# Patient Record
Sex: MALE | Race: ASIAN | Employment: FULL TIME | URBAN - METROPOLITAN AREA
[De-identification: names, ages, dates, MRNs, and addresses within clinical notes are randomized per-mention and may not be internally consistent; named-entity substitution may affect disease eponyms.]

---

## 2017-05-18 ENCOUNTER — MED REC SCAN ONLY (OUTPATIENT)
Dept: FAMILY MEDICINE CLINIC | Facility: CLINIC | Age: 47
End: 2017-05-18

## 2020-07-08 ENCOUNTER — MED REC SCAN ONLY (OUTPATIENT)
Dept: FAMILY MEDICINE CLINIC | Facility: CLINIC | Age: 50
End: 2020-07-08

## 2020-09-29 ENCOUNTER — TELEPHONE (OUTPATIENT)
Dept: FAMILY MEDICINE CLINIC | Facility: CLINIC | Age: 50
End: 2020-09-29

## 2020-09-29 NOTE — TELEPHONE ENCOUNTER
Patient stated he has had pain in left side of face for past 3-4 days. He is scheduled with Dr. Leonela Hardin for tomorrow 9/30. Please advise if he needs triage.

## 2020-09-30 ENCOUNTER — OFFICE VISIT (OUTPATIENT)
Dept: FAMILY MEDICINE CLINIC | Facility: CLINIC | Age: 50
End: 2020-09-30
Payer: COMMERCIAL

## 2020-09-30 VITALS
TEMPERATURE: 98 F | DIASTOLIC BLOOD PRESSURE: 90 MMHG | HEART RATE: 61 BPM | OXYGEN SATURATION: 98 % | HEIGHT: 68 IN | RESPIRATION RATE: 18 BRPM | WEIGHT: 140 LBS | SYSTOLIC BLOOD PRESSURE: 124 MMHG | BODY MASS INDEX: 21.22 KG/M2

## 2020-09-30 DIAGNOSIS — M26.649 TMJ ARTHRITIS: Primary | ICD-10-CM

## 2020-09-30 DIAGNOSIS — R03.0 ELEVATED BLOOD-PRESSURE READING WITHOUT DIAGNOSIS OF HYPERTENSION: ICD-10-CM

## 2020-09-30 DIAGNOSIS — Z00.00 BLOOD TESTS FOR ROUTINE GENERAL PHYSICAL EXAMINATION: ICD-10-CM

## 2020-09-30 PROCEDURE — 3008F BODY MASS INDEX DOCD: CPT | Performed by: FAMILY MEDICINE

## 2020-09-30 PROCEDURE — 3074F SYST BP LT 130 MM HG: CPT | Performed by: FAMILY MEDICINE

## 2020-09-30 PROCEDURE — 99202 OFFICE O/P NEW SF 15 MIN: CPT | Performed by: FAMILY MEDICINE

## 2020-09-30 PROCEDURE — 3080F DIAST BP >= 90 MM HG: CPT | Performed by: FAMILY MEDICINE

## 2020-09-30 RX ORDER — PREDNISONE 20 MG/1
20 TABLET ORAL DAILY
Qty: 5 TABLET | Refills: 0 | Status: SHIPPED | OUTPATIENT
Start: 2020-09-30 | End: 2020-10-05

## 2020-09-30 NOTE — PROGRESS NOTES
/90   Pulse 61   Temp 98.2 °F (36.8 °C) (Temporal)   Resp 18   Ht 68\"   Wt 140 lb (63.5 kg)   SpO2 98%   BMI 21.29 kg/m²               Patient presents with:  Pain: left face pain       HPI;    Presybeterianelvis Chand is a 45year old male.   Who is been see exertion  GI: denies abdominal pain and denies heartburn  NEURO: denies headaches  EXAM:   /90   Pulse 61   Temp 98.2 °F (36.8 °C) (Temporal)   Resp 18   Ht 68\"   Wt 140 lb (63.5 kg)   SpO2 98%   BMI 21.29 kg/m²   GENERAL: well developed, well leo CBC W Differential W Platelet      Comp Metabolic Panel      Lipid Panel      TSH W Reflex To Free T4      PSA            Shadia Rosa MD   [address] [place] [address]    Electronically signed    This dictation was pe

## 2020-10-21 ENCOUNTER — MED REC SCAN ONLY (OUTPATIENT)
Dept: FAMILY MEDICINE CLINIC | Facility: CLINIC | Age: 50
End: 2020-10-21

## 2021-02-21 ENCOUNTER — APPOINTMENT (OUTPATIENT)
Dept: GENERAL RADIOLOGY | Age: 51
End: 2021-02-21
Attending: PHYSICIAN ASSISTANT
Payer: COMMERCIAL

## 2021-02-21 ENCOUNTER — HOSPITAL ENCOUNTER (OUTPATIENT)
Age: 51
Discharge: HOME | End: 2021-02-21
Payer: COMMERCIAL

## 2021-02-21 VITALS
RESPIRATION RATE: 18 BRPM | TEMPERATURE: 98 F | BODY MASS INDEX: 21 KG/M2 | HEART RATE: 72 BPM | WEIGHT: 140 LBS | SYSTOLIC BLOOD PRESSURE: 146 MMHG | OXYGEN SATURATION: 96 % | DIASTOLIC BLOOD PRESSURE: 84 MMHG

## 2021-02-21 DIAGNOSIS — M25.559 HIP PAIN: Primary | ICD-10-CM

## 2021-02-21 DIAGNOSIS — M54.41 RIGHT-SIDED LOW BACK PAIN WITH RIGHT-SIDED SCIATICA, UNSPECIFIED CHRONICITY: ICD-10-CM

## 2021-02-21 PROCEDURE — 99203 OFFICE O/P NEW LOW 30 MIN: CPT | Performed by: PHYSICIAN ASSISTANT

## 2021-02-21 PROCEDURE — 73502 X-RAY EXAM HIP UNI 2-3 VIEWS: CPT | Performed by: PHYSICIAN ASSISTANT

## 2021-02-21 RX ORDER — CYCLOBENZAPRINE HCL 5 MG
5 TABLET ORAL 3 TIMES DAILY PRN
Qty: 15 TABLET | Refills: 0 | Status: SHIPPED | OUTPATIENT
Start: 2021-02-21 | End: 2021-02-26

## 2021-02-21 RX ORDER — METHYLPREDNISOLONE 4 MG/1
TABLET ORAL
Qty: 1 PACKAGE | Refills: 0 | Status: SHIPPED | OUTPATIENT
Start: 2021-02-21

## 2021-02-21 NOTE — ED INITIAL ASSESSMENT (HPI)
RLQ pain x 2 months. Pain now radiating around to r flank & down front of leg. Denies fevers, denies n/v/d. Normal BM PTA. Denies UTI s/s. Worse w ambulation. Denies constipation.

## 2021-02-21 NOTE — ED PROVIDER NOTES
Patient Seen in: Immediate [address] [place], [place] [address]      History   Patient presents with:  Abdomen/Flank Pain: RLQ x 2 months    Stated Complaint: abdominal pain    HPI/Subjective:   HPI    CHIEF COMPLAINT: Right-sided back, hip pain    HISTORY OF PRESENT I and agree. The patient's family history is reviewed and is noncontributory to the presenting problem, except as indicated as above.     Objective:   Past Medical History:   Diagnosis Date   • Chest pain, unspecified    • Impaired fasting glucose    • Lumbag hernias. No rashes. Femoral pulse 2+. Lumbar spine: No midline or bony tenderness, but there is right-sided paraspinal muscle spasm and tenderness that extends in the right gluteal region and into the right lateral hip.   Pain is reproducible with moveme symptoms. Patient was screened and evaluated during this visit. I determined, within reasonable clinical confidence and prior to discharge, that an emergency medical condition was not or was no longer present.   There was no indication for further eval Medication List    START taking these medications    methylPREDNISolone (MEDROL) 4 MG Oral Tablet Therapy Pack  Dosepack: take as directed  Qty: 1 Package Refills: 0    cyclobenzaprine 5 MG Oral Tab  Take 1 tablet (5 mg total) by mouth 3 (three) times yair

## 2021-02-25 DIAGNOSIS — R73.01 IMPAIRED FASTING GLUCOSE: Primary | ICD-10-CM

## 2021-02-25 NOTE — PROGRESS NOTES
Your  blood work is normal, except for elevated sugar at 120 and bad cholesterol mildly elevated at 105    Hemoglobin A1c has been added to your labs and we will report that separately

## 2021-02-26 LAB
ABSOLUTE BASOPHILS: 64 CELLS/UL (ref 0–200)
ABSOLUTE EOSINOPHILS: 42 CELLS/UL (ref 15–500)
ABSOLUTE LYMPHOCYTES: 2608 CELLS/UL (ref 850–3900)
ABSOLUTE MONOCYTES: 806 CELLS/UL (ref 200–950)
ABSOLUTE NEUTROPHILS: 7081 CELLS/UL (ref 1500–7800)
ALBUMIN/GLOBULIN RATIO: 1.7 (CALC) (ref 1–2.5)
ALBUMIN: 4.4 G/DL (ref 3.6–5.1)
ALKALINE PHOSPHATASE: 66 U/L (ref 35–144)
ALT: 21 U/L (ref 9–46)
AST: 16 U/L (ref 10–35)
BASOPHILS: 0.6 %
BILIRUBIN, TOTAL: 1 MG/DL (ref 0.2–1.2)
BUN: 13 MG/DL (ref 7–25)
CALCIUM: 9.6 MG/DL (ref 8.6–10.3)
CARBON DIOXIDE: 27 MMOL/L (ref 20–32)
CHLORIDE: 104 MMOL/L (ref 98–110)
CHOL/HDLC RATIO: 3.7 (CALC)
CHOLESTEROL, TOTAL: 171 MG/DL
CREATININE: 0.97 MG/DL (ref 0.7–1.33)
EGFR IF AFRICN AM: 104 ML/MIN/1.73M2
EGFR IF NONAFRICN AM: 90 ML/MIN/1.73M2
EOSINOPHILS: 0.4 %
GLOBULIN: 2.6 G/DL (CALC) (ref 1.9–3.7)
GLUCOSE: 120 MG/DL (ref 65–99)
HDL CHOLESTEROL: 46 MG/DL
HEMATOCRIT: 43.7 % (ref 38.5–50)
HEMOGLOBIN A1C: 6.4 % OF TOTAL HGB
HEMOGLOBIN: 14.3 G/DL (ref 13.2–17.1)
LDL-CHOLESTEROL: 105 MG/DL (CALC)
LYMPHOCYTES: 24.6 %
MCH: 26.5 PG (ref 27–33)
MCHC: 32.7 G/DL (ref 32–36)
MCV: 80.9 FL (ref 80–100)
MONOCYTES: 7.6 %
MPV: 10.8 FL (ref 7.5–12.5)
NEUTROPHILS: 66.8 %
NON-HDL CHOLESTEROL: 125 MG/DL (CALC)
PLATELET COUNT: 246 THOUSAND/UL (ref 140–400)
POTASSIUM: 3.8 MMOL/L (ref 3.5–5.3)
PROTEIN, TOTAL: 7 G/DL (ref 6.1–8.1)
PSA, TOTAL: 1 NG/ML
RDW: 13 % (ref 11–15)
RED BLOOD CELL COUNT: 5.4 MILLION/UL (ref 4.2–5.8)
SODIUM: 141 MMOL/L (ref 135–146)
TRIGLYCERIDES: 104 MG/DL
TSH W/REFLEX TO FT4: 1.63 MIU/L (ref 0.4–4.5)
WHITE BLOOD CELL COUNT: 10.6 THOUSAND/UL (ref 3.8–10.8)

## 2021-02-26 NOTE — PROGRESS NOTES
Your hemoglobin A1c is 6.4 which is in the prediabetic range  Continue diet and exercise  Monitor your sugar intake  We will recheck again in 6 months

## 2021-03-01 ENCOUNTER — OFFICE VISIT (OUTPATIENT)
Dept: FAMILY MEDICINE CLINIC | Facility: CLINIC | Age: 51
End: 2021-03-01
Payer: COMMERCIAL

## 2021-03-01 VITALS
SYSTOLIC BLOOD PRESSURE: 126 MMHG | WEIGHT: 146 LBS | RESPIRATION RATE: 16 BRPM | OXYGEN SATURATION: 99 % | HEART RATE: 55 BPM | DIASTOLIC BLOOD PRESSURE: 84 MMHG | BODY MASS INDEX: 22.13 KG/M2 | TEMPERATURE: 97 F | HEIGHT: 68 IN

## 2021-03-01 DIAGNOSIS — G89.29 CHRONIC BILATERAL LOW BACK PAIN, UNSPECIFIED WHETHER SCIATICA PRESENT: ICD-10-CM

## 2021-03-01 DIAGNOSIS — Z12.11 SCREENING FOR MALIGNANT NEOPLASM OF COLON: ICD-10-CM

## 2021-03-01 DIAGNOSIS — M54.50 CHRONIC BILATERAL LOW BACK PAIN, UNSPECIFIED WHETHER SCIATICA PRESENT: ICD-10-CM

## 2021-03-01 DIAGNOSIS — Z00.00 ENCOUNTER FOR ANNUAL PHYSICAL EXAM: Primary | ICD-10-CM

## 2021-03-01 DIAGNOSIS — R73.03 PREDIABETES: ICD-10-CM

## 2021-03-01 DIAGNOSIS — Z23 NEED FOR VACCINATION: ICD-10-CM

## 2021-03-01 PROCEDURE — 99213 OFFICE O/P EST LOW 20 MIN: CPT | Performed by: FAMILY MEDICINE

## 2021-03-01 PROCEDURE — 3074F SYST BP LT 130 MM HG: CPT | Performed by: FAMILY MEDICINE

## 2021-03-01 PROCEDURE — 90471 IMMUNIZATION ADMIN: CPT | Performed by: FAMILY MEDICINE

## 2021-03-01 PROCEDURE — 90750 HZV VACC RECOMBINANT IM: CPT | Performed by: FAMILY MEDICINE

## 2021-03-01 PROCEDURE — 3008F BODY MASS INDEX DOCD: CPT | Performed by: FAMILY MEDICINE

## 2021-03-01 PROCEDURE — 99396 PREV VISIT EST AGE 40-64: CPT | Performed by: FAMILY MEDICINE

## 2021-03-01 PROCEDURE — 3079F DIAST BP 80-89 MM HG: CPT | Performed by: FAMILY MEDICINE

## 2021-03-02 NOTE — PROGRESS NOTES
/84   Pulse 55   Temp 96.7 °F (35.9 °C) (Temporal)   Resp 16   Ht 5' 8\" (1.727 m)   Wt 146 lb (66.2 kg)   SpO2 99%   BMI 22.20 kg/m²  Body mass index is 22.2 kg/m².      Patient presents with:  Physical      Jan Guerita Crump is a 45year old male wh Sexual Activity      Alcohol use: No      Drug use: No    Other Topics      Concerns:        Caffeine Concern: Yes        Exercise: Yes        Seat Belt: Yes     Exercise: none.   Diet: doesn't watch     REVIEW OF SYSTEMS:   GENERAL HEALTH: feels well other annual physical exam  Fasting labs discussed, impaired fasting glucose, prediabetes as well as slightly elevated LDL  Patient is advised to diet and exercise  We will recheck his hemoglobin A1c in 6 months  Immunizations reviewed   Shingles vaccine #1 admi office note. If so, please bring any errors to my attention for an addendum.  All efforts were made to ensure that this office note is accurate

## 2021-04-05 ENCOUNTER — MED REC SCAN ONLY (OUTPATIENT)
Dept: FAMILY MEDICINE CLINIC | Facility: CLINIC | Age: 51
End: 2021-04-05

## 2021-10-28 ENCOUNTER — TELEPHONE (OUTPATIENT)
Dept: FAMILY MEDICINE CLINIC | Facility: CLINIC | Age: 51
End: 2021-10-28

## 2023-04-14 ENCOUNTER — TELEPHONE (OUTPATIENT)
Dept: FAMILY MEDICINE CLINIC | Facility: CLINIC | Age: 53
End: 2023-04-14

## 2023-04-17 ENCOUNTER — PATIENT OUTREACH (OUTPATIENT)
Dept: CASE MANAGEMENT | Age: 53
End: 2023-04-17

## 2023-04-17 NOTE — PROCEDURES
The office order for PCP removal request is Denied and finalized on April 17, 2023. Denied - Office to Verify PCP Status:   1. Office must call patient to verify PCP status and request to schedule an appointment with provider. 2. If no response, Office should also send a letter to patient via mail and My-Chart requesting to schedule office visit with provider.     Thanks,  [facility] [employer]

## 2023-06-05 ENCOUNTER — OFFICE VISIT (OUTPATIENT)
Dept: FAMILY MEDICINE CLINIC | Facility: CLINIC | Age: 53
End: 2023-06-05
Payer: COMMERCIAL

## 2023-06-05 VITALS
HEART RATE: 78 BPM | SYSTOLIC BLOOD PRESSURE: 124 MMHG | WEIGHT: 148 LBS | BODY MASS INDEX: 22.43 KG/M2 | RESPIRATION RATE: 18 BRPM | HEIGHT: 68 IN | TEMPERATURE: 98 F | DIASTOLIC BLOOD PRESSURE: 78 MMHG | OXYGEN SATURATION: 97 %

## 2023-06-05 DIAGNOSIS — Z23 NEED FOR VACCINATION: ICD-10-CM

## 2023-06-05 DIAGNOSIS — Z12.11 SCREENING FOR COLON CANCER: ICD-10-CM

## 2023-06-05 DIAGNOSIS — G89.29 CHRONIC BILATERAL BACK PAIN, UNSPECIFIED BACK LOCATION: ICD-10-CM

## 2023-06-05 DIAGNOSIS — Z00.00 ENCOUNTER FOR ANNUAL PHYSICAL EXAM: Primary | ICD-10-CM

## 2023-06-05 DIAGNOSIS — M54.9 CHRONIC BILATERAL BACK PAIN, UNSPECIFIED BACK LOCATION: ICD-10-CM

## 2023-06-05 PROCEDURE — 3074F SYST BP LT 130 MM HG: CPT | Performed by: FAMILY MEDICINE

## 2023-06-05 PROCEDURE — 99396 PREV VISIT EST AGE 40-64: CPT | Performed by: FAMILY MEDICINE

## 2023-06-05 PROCEDURE — 90750 HZV VACC RECOMBINANT IM: CPT | Performed by: FAMILY MEDICINE

## 2023-06-05 PROCEDURE — 3078F DIAST BP <80 MM HG: CPT | Performed by: FAMILY MEDICINE

## 2023-06-05 PROCEDURE — 90471 IMMUNIZATION ADMIN: CPT | Performed by: FAMILY MEDICINE

## 2023-06-05 PROCEDURE — 3008F BODY MASS INDEX DOCD: CPT | Performed by: FAMILY MEDICINE

## 2023-07-26 ENCOUNTER — TELEPHONE (OUTPATIENT)
Dept: FAMILY MEDICINE CLINIC | Facility: CLINIC | Age: 53
End: 2023-07-26

## 2024-01-29 ENCOUNTER — TELEPHONE (OUTPATIENT)
Dept: FAMILY MEDICINE CLINIC | Facility: CLINIC | Age: 54
End: 2024-01-29

## 2024-08-29 ENCOUNTER — OFFICE VISIT (OUTPATIENT)
Facility: LOCATION | Age: 54
End: 2024-08-29
Payer: COMMERCIAL

## 2024-08-29 VITALS
OXYGEN SATURATION: 99 % | HEIGHT: 68 IN | RESPIRATION RATE: 18 BRPM | BODY MASS INDEX: 22.43 KG/M2 | HEART RATE: 62 BPM | WEIGHT: 148 LBS | TEMPERATURE: 97 F

## 2024-08-29 DIAGNOSIS — Z12.11 SCREEN FOR COLON CANCER: Primary | ICD-10-CM

## 2024-08-29 DIAGNOSIS — Z12.11 ENCOUNTER FOR SCREENING COLONOSCOPY: ICD-10-CM

## 2024-08-29 RX ORDER — POLYETHYLENE GLYCOL 3350, SODIUM CHLORIDE, SODIUM BICARBONATE, POTASSIUM CHLORIDE 420; 11.2; 5.72; 1.48 G/4L; G/4L; G/4L; G/4L
POWDER, FOR SOLUTION ORAL
Qty: 1 EACH | Refills: 0 | Status: SHIPPED | OUTPATIENT
Start: 2024-08-29

## 2024-09-05 NOTE — PROGRESS NOTES
New Patient Visit Note       Active Problems      1. Screen for colon cancer    2. Encounter for screening colonoscopy        Chief Complaint   Chief Complaint   Patient presents with    Colonoscopy     EP-colonoscopy no family history/no pain         PCP  Fitz Mooney MD     History of Present Illness   Jan Piper is a 54 year old male who presents for evaluation for colonoscopy.    The patient has not had previous colonoscopy.     The patient's family history is negative .    The patient denies nausea, vomiting, abdominal pain or cramping or bloating, diarrhea, constipation, bright red blood in the stool, dark tarry stools, other recent changes in bowel habits, or recent weight loss.    The patient's past medical history   Past Medical History:    Chest pain, unspecified    Impaired fasting glucose    Lumbago    Tietze's disease         The patient's past surgical history History reviewed. No pertinent surgical history.      The patient takes no blood thinners..      Past Medical / Surgical / Social / Family History    The past medical history, past surgical history, family history, social history, allergies, and medications have been reviewed by me today.    No current outpatient medications on file prior to visit.     No current facility-administered medications on file prior to visit.        Review of Systems  Constitutional: No Fever, No Chills, No Diaphoresis, No fatigue, No weight loss, No anorexia  Eyes: No burry vision, No icterus  ENT: No tinnitus, No rhinorrhea, No dysphagia, No odynophagia  Respiratory: No dyspnea, No wheezing  Cardiovascular: No chest pain, No palpitations, No leg swelling  Gastrointestinal: No abdominal pain, No abdominal distention, No bloating, No nausea, No vomiting, No diarrhea, No constipation, No melena, No hematochezia  Endocrine: No polydipsia, No polyuria  Genitoruinary: No dysuria, No Hematuria  Musculoskeletal: No joint pain, No muscle pain  Neurologic: No dizzyness,  No syncope, No headaches, No numbness  Hematologic: No easy bruising, No easy bleeding  Psychiatric: No anxiety, No depression      Physical Findings   Pulse 62   Temp 96.9 °F (36.1 °C)   Resp 18   Ht 68\"   Wt 148 lb (67.1 kg)   SpO2 99%   BMI 22.50 kg/m²     Constitutional: Well nourished, well kempt  Eyes: EOMI, no scleral icterus  ENT: Nares moist, oropharynx clear  Neck: Supple, no tracheal deviation   Cardiac: Normal rate, no JVD  Respiratory: No respiratory distress, No audible wheezing  Abdominal: Soft, Nontender, Nondistended  Muskuloskeletal: Normal gait, Full ROM in all extremities  Lymphatic: no cervical or supraclavicular adenopathy  Skin: No rashes,  No lesions  Neurologic: No focal deficits  Psych: Appropriate mood and affect, oriented x3            Assessment   1. Screen for colon cancer    2. Encounter for screening colonoscopy          Plan   The patient is 54 year old and has not had a colonoscopy. The patient's family history is negative. The patient does not have gastrointestinal symptoms.     Recommend proceeding with colonoscopy.    The benefits of colonoscopy, including detection of cancer and polyp removal prior to progression to cancer were discussed. The details of the procedure which include navigation of the colonoscope through the anus, rectum, and colon to evaluate the mucosa and removal of any polyps encountered were discussed as well. The risks of colonoscopy were discussed with the patient and include but are not limited to post-procedure bleeding, infection, colorectal perforation, splenic injury, missed polyps, need for additional surgeries or interventions. All questions were answered and the patient voiced understanding and agreed to proceed with colonoscopy.            Davy Boland MD

## 2024-10-15 ENCOUNTER — TELEPHONE (OUTPATIENT)
Facility: LOCATION | Age: 54
End: 2024-10-15

## 2024-10-15 NOTE — TELEPHONE ENCOUNTER
No authorization required for cpt code 61375. Ref #: [id number]. Spoke with Kalee SOARES @ 12:18pm on 10/15

## 2024-10-22 ENCOUNTER — TELEPHONE (OUTPATIENT)
Facility: LOCATION | Age: 54
End: 2024-10-22

## 2024-10-22 NOTE — TELEPHONE ENCOUNTER
Sent patient Colonoscopy prep instruction in [employer]. Also called and spoke with patient. Confirmed prep instructions. Patient stated he did not have prep medication. Informed patient that we would re send prescription. Spoke with [facility] pharmacy [facility]. They will fill previously sent prescription.

## 2024-10-22 NOTE — TELEPHONE ENCOUNTER
Patient calling because he doesn't have his colonoscopy prep sheet, and wants to know what to do.    Please advise  Best callback number is [phone]

## 2024-10-23 ENCOUNTER — ANESTHESIA EVENT (OUTPATIENT)
Dept: ENDOSCOPY | Facility: HOSPITAL | Age: 54
End: 2024-10-23
Payer: COMMERCIAL

## 2024-10-23 NOTE — ANESTHESIA PREPROCEDURE EVALUATION
PRE-OP EVALUATION    Patient Name: Jan Piper    Admit Diagnosis: Screen for colon cancer [Z12.11]    Pre-op Diagnosis: Screen for colon cancer [Z12.11]    COLONOSCOPY    Anesthesia Procedure: COLONOSCOPY    Surgeons and Role:     * Davy Boland MD - Primary    Pre-op vitals reviewed.        Body mass index is 22.5 kg/m².    Current medications reviewed.  Hospital Medications:  No current facility-administered medications on file as of .       Outpatient Medications:   Prescriptions Prior to Admission[1]    Allergies: Patient has no known allergies.      Anesthesia Evaluation    Patient summary reviewed.    Anesthetic Complications  (-) history of anesthetic complications         GI/Hepatic/Renal    Negative GI/hepatic/renal ROS.                             Cardiovascular    Negative cardiovascular ROS.    Exercise tolerance: good     MET: >4                                           Endo/Other                           (+) arthritis       Pulmonary    Negative pulmonary ROS.                       Neuro/Psych    Negative neuro/psych ROS.                          Patient Active Problem List:     TMJ arthritis     Prediabetes        History reviewed. No pertinent surgical history.  Social History     Socioeconomic History   • Marital status:    Tobacco Use   • Smoking status: Never   • Smokeless tobacco: Never   • Tobacco comments:     Non-smoker   Vaping Use   • Vaping status: Never Used   Substance and Sexual Activity   • Alcohol use: Not Currently     Alcohol/week: 2.0 standard drinks of alcohol     Types: 2 Shots of liquor per week     Comment: Occasionally   • Drug use: No   Other Topics Concern   • Caffeine Concern No   • Stress Concern No   • Weight Concern No   • Special Diet No   • Exercise Yes     Comment: 3 miles walking daily   • Seat Belt No     History   Drug Use No     Available pre-op labs reviewed.               Airway      Mallampati: II  Mouth opening: 3 FB  TM distance: 4 - 6  cm  Neck ROM: full Cardiovascular    Cardiovascular exam normal.         Dental    Dentition appears grossly intact         Pulmonary    Pulmonary exam normal.                 Other findings        ASA: 2   Plan: MAC  NPO status verified and patient meets guidelines.    Post-procedure pain management plan discussed with surgeon and patient.      Plan/risks discussed with: patient            Present on Admission:  **None**             [1]   No medications prior to admission.

## 2024-10-24 ENCOUNTER — ANESTHESIA (OUTPATIENT)
Dept: ENDOSCOPY | Facility: HOSPITAL | Age: 54
End: 2024-10-24
Payer: COMMERCIAL

## 2024-10-24 ENCOUNTER — HOSPITAL ENCOUNTER (OUTPATIENT)
Facility: HOSPITAL | Age: 54
Setting detail: HOSPITAL OUTPATIENT SURGERY
Discharge: HOME | End: 2024-10-24
Attending: STUDENT IN AN ORGANIZED HEALTH CARE EDUCATION/TRAINING PROGRAM | Admitting: STUDENT IN AN ORGANIZED HEALTH CARE EDUCATION/TRAINING PROGRAM
Payer: COMMERCIAL

## 2024-10-24 VITALS
SYSTOLIC BLOOD PRESSURE: 111 MMHG | OXYGEN SATURATION: 94 % | HEART RATE: 74 BPM | TEMPERATURE: 98 F | DIASTOLIC BLOOD PRESSURE: 79 MMHG | HEIGHT: 68 IN | WEIGHT: 148 LBS | BODY MASS INDEX: 22.43 KG/M2 | RESPIRATION RATE: 16 BRPM

## 2024-10-24 DIAGNOSIS — Z12.11 SCREEN FOR COLON CANCER: ICD-10-CM

## 2024-10-24 PROCEDURE — 0DBN8ZX EXCISION OF SIGMOID COLON, VIA NATURAL OR ARTIFICIAL OPENING ENDOSCOPIC, DIAGNOSTIC: ICD-10-PCS | Performed by: STUDENT IN AN ORGANIZED HEALTH CARE EDUCATION/TRAINING PROGRAM

## 2024-10-24 PROCEDURE — 88305 TISSUE EXAM BY PATHOLOGIST: CPT | Performed by: STUDENT IN AN ORGANIZED HEALTH CARE EDUCATION/TRAINING PROGRAM

## 2024-10-24 PROCEDURE — 0DBP8ZX EXCISION OF RECTUM, VIA NATURAL OR ARTIFICIAL OPENING ENDOSCOPIC, DIAGNOSTIC: ICD-10-PCS | Performed by: STUDENT IN AN ORGANIZED HEALTH CARE EDUCATION/TRAINING PROGRAM

## 2024-10-24 PROCEDURE — 0DBH8ZX EXCISION OF CECUM, VIA NATURAL OR ARTIFICIAL OPENING ENDOSCOPIC, DIAGNOSTIC: ICD-10-PCS | Performed by: STUDENT IN AN ORGANIZED HEALTH CARE EDUCATION/TRAINING PROGRAM

## 2024-10-24 RX ORDER — SODIUM CHLORIDE, SODIUM LACTATE, POTASSIUM CHLORIDE, CALCIUM CHLORIDE 600; 310; 30; 20 MG/100ML; MG/100ML; MG/100ML; MG/100ML
INJECTION, SOLUTION INTRAVENOUS CONTINUOUS
Status: DISCONTINUED | OUTPATIENT
Start: 2024-10-24 | End: 2024-10-24

## 2024-10-24 RX ORDER — LIDOCAINE HYDROCHLORIDE 10 MG/ML
INJECTION, SOLUTION EPIDURAL; INFILTRATION; INTRACAUDAL; PERINEURAL AS NEEDED
Status: DISCONTINUED | OUTPATIENT
Start: 2024-10-24 | End: 2024-10-24 | Stop reason: SURG

## 2024-10-24 RX ADMIN — LIDOCAINE HYDROCHLORIDE 50 MG: 10 INJECTION, SOLUTION EPIDURAL; INFILTRATION; INTRACAUDAL; PERINEURAL at 08:46:00

## 2024-10-24 RX ADMIN — SODIUM CHLORIDE, SODIUM LACTATE, POTASSIUM CHLORIDE, CALCIUM CHLORIDE: 600; 310; 30; 20 INJECTION, SOLUTION INTRAVENOUS at 09:13:00

## 2024-10-24 NOTE — DISCHARGE INSTRUCTIONS
Home Care Instructions for Colonoscopy with Sedation    Diet:  - Resume your regular diet as tolerated unless otherwise instructed.  - Start with light meals to minimize bloating.  - Do not drink alcohol today.    Medication:  - If you have questions about resuming your normal medications, please contact your Primary Care Physician.    Activities:  - Take it easy today. Do not return to work today.  - Do not drive today.  - Do not operate any machinery today (including kitchen equipment).    Colonoscopy:  - You may notice some rectal \"spotting\" (a little blood on the toilet tissue) for a day or two after the exam. This is normal.  - If you experience any rectal bleeding (not spotting), persistent tenderness or sharp severe abdominal pains, oral temperature over 100 degrees Fahrenheit, light-headedness or dizziness, or any other problems, contact your doctor.    **If unable to reach your doctor, please go to the [facility] Emergency Room**    - Your referring physician will receive a full report of your examination.  - If you do not hear from your doctor's office within two weeks of your biopsy, please call them for your results.    You may be able to see your laboratory results in [employer] between 4 and 7 business days.  In some cases, your physician may not have viewed the results before they are released to [employer].  If you have questions regarding your results contact the physician who ordered the test/exam by phone or via [employer] by choosing \"Ask a Medical Question.\"

## 2024-10-24 NOTE — H&P
New Patient Visit Note     Active Problems      1. Screen for colon cancer    2. Encounter for screening colonoscopy          Chief Complaint        Chief Complaint   Patient presents with    Colonoscopy       EP-colonoscopy no family history/no pain            PCP  Fitz Mooney MD      History of Present Illness   Jan Piper is a 54 year old male who presents for evaluation for colonoscopy.     The patient has not had previous colonoscopy.      The patient's family history is negative .     The patient denies nausea, vomiting, abdominal pain or cramping or bloating, diarrhea, constipation, bright red blood in the stool, dark tarry stools, other recent changes in bowel habits, or recent weight loss.     The patient's past medical history   Past Medical History       Past Medical History:    Chest pain, unspecified    Impaired fasting glucose    Lumbago    Tietze's disease              The patient's past surgical history   Past Surgical History   History reviewed. No pertinent surgical history.           The patient takes no blood thinners..        Past Medical / Surgical / Social / Family History    The past medical history, past surgical history, family history, social history, allergies, and medications have been reviewed by me today.     Medications Ordered Prior to this Encounter   No current outpatient medications on file prior to visit.      No current facility-administered medications on file prior to visit.            Review of Systems  Constitutional: No Fever, No Chills, No Diaphoresis, No fatigue, No weight loss, No anorexia  Eyes: No burry vision, No icterus  ENT: No tinnitus, No rhinorrhea, No dysphagia, No odynophagia  Respiratory: No dyspnea, No wheezing  Cardiovascular: No chest pain, No palpitations, No leg swelling  Gastrointestinal: No abdominal pain, No abdominal distention, No bloating, No nausea, No vomiting, No diarrhea, No constipation, No melena, No hematochezia  Endocrine: No  polydipsia, No polyuria  Genitoruinary: No dysuria, No Hematuria  Musculoskeletal: No joint pain, No muscle pain  Neurologic: No dizzyness, No syncope, No headaches, No numbness  Hematologic: No easy bruising, No easy bleeding  Psychiatric: No anxiety, No depression        Physical Findings      Constitutional: Well nourished, well kempt  Eyes: EOMI, no scleral icterus  ENT: Nares moist, oropharynx clear  Neck: Supple, no tracheal deviation   Cardiac: Normal rate, no JVD  Respiratory: No respiratory distress, No audible wheezing  Abdominal: Soft, Nontender, Nondistended  Muskuloskeletal: Normal gait, Full ROM in all extremities  Lymphatic: no cervical or supraclavicular adenopathy  Skin: No rashes,  No lesions  Neurologic: No focal deficits  Psych: Appropriate mood and affect, oriented x3           Assessment   1. Screen for colon cancer    2. Encounter for screening colonoscopy             Plan   The patient is 54 year old and has not had a colonoscopy. The patient's family history is negative. The patient does not have gastrointestinal symptoms.      Recommend proceeding with colonoscopy.     The benefits of colonoscopy, including detection of cancer and polyp removal prior to progression to cancer were discussed. The details of the procedure which include navigation of the colonoscope through the anus, rectum, and colon to evaluate the mucosa and removal of any polyps encountered were discussed as well. The risks of colonoscopy were discussed with the patient and include but are not limited to post-procedure bleeding, infection, colorectal perforation, splenic injury, missed polyps, need for additional surgeries or interventions. All questions were answered and the patient voiced understanding and agreed to proceed with colonoscopy.                 Davy Boland MD

## 2024-10-24 NOTE — OPERATIVE REPORT
[facility]  Operative Note    Jan Piper Location: OR   [facility] [id number] MRN [id number]    1970 Age 54 year old   Admission Date 10/24/2024 Operation Date 10/24/2024   Attending Physician Davy Boland MD Operating Physician Davy Boland MD   PCP Fitz Mooney MD          Patient Name: Jan Piper    Preoperative Diagnosis: Screen for colon cancer [Z12.11]    Postoperative Diagnosis: Same as pre-op diagnosis.    Primary Surgeon: Davy Boland MD    Anesthesia: MAC    Anesthesiologist: Anesthesiologist.: Myerson, David, MD    Procedures: Colonoscopy to the cecum , polypectomy x 4     Specimen:   Appendiceal orifice polyp  Rectosigmoid junction polyp  Rectal polyp x2    Estimated Blood Loss:3mL     Complications: None immediate    Condition: Good    Indications for Surgery:   Jan Piper is a 54 year old male who is here for screening colonoscopy. The benefits of colonoscopy, including detection of cancer and polyp removal prior to progression to cancer were discussed. The details of the procedure which include navigation of the colonoscope through the anus, rectum, and colon to evaluate the mucosa and removal of any polyps encountered were discussed as well. The risks of colonoscopy were discussed with the patient and include but are not limited to post-procedure bleeding, infection, colorectal perforation, splenic injury, missed polyps, need for additional surgeries or interventions. All questions were answered and the patient voiced understanding and agreed to proceed with colonoscopy.      Surgical Findings:   The quality of the bowel prep was excellent. 3,3,3    Description of Procedure:   The Patient was taken to the endoscopy suite and positioned in the left lateral decubitus position with knees flexed. Monitored anesthesia care was administered. A time-out was performed.    The perineum and perianal skin were examined. A digital rectal examination was  performed. No masses or abnormalities noted. A well-lubricated adult colonoscope was then inserted and carefully navigated to the cecum. Advancement was accomplished without difficulty. The appendiceal orifice and ileocecal valve were identified and photographed. The terminal ileum was not intubated. The scope was then withdrawn as the mucosa was circumferentially examined. A 2 mm sessile polyp was noted at the appendiceal orifice and was removed with foreceps. The distal sigmoid colon showed a 3mm pedunclated polyp that was removed with cold snare. The rectum showed a 1 mm hyperplastic polyp that was removed with foreceps and a 2mm sessile polyp that was removed with a cold snare.  In the rectum the scope was retroflexed to evaluate the anorectal junction.  The scope was straightened and excess gas was suctioned from the colon and the scope removed, terminating the procedure.    Anesthesia was terminated and the patient transported to the recovery unit in good condition. The patient tolerated the procedure well without apparent intraoperative complication.    Follow up:   Patient will need next colonoscopy pending pathology results.       Davy Boland MD  10/24/2024  9:11 AM

## 2024-10-24 NOTE — ANESTHESIA POSTPROCEDURE EVALUATION
[facility]    Jan Piper Patient Status:  Hospital Outpatient Surgery   Age/Gender 54 year old male MRN [id number]   Location [facility] Attending Davy Boland MD   Hosp Day # 0 PCP Fitz Mooney MD       Anesthesia Post-op Note    COLONOSCOPY with forcep and cold snare polypectomy    Procedure Summary       Date: 10/24/24 Room / Location:  ENDOSCOPY 02 / [facility]    Anesthesia Start: 0842 Anesthesia Stop: 0913    Procedure: COLONOSCOPY with forcep and cold snare polypectomy Diagnosis:       Screen for colon cancer      (Polyps, diverticulosis)    Surgeons: Davy Boland MD Anesthesiologist: Myerson, David, MD    Anesthesia Type: MAC ASA Status: 2            Anesthesia Type: MAC    Vitals Value Taken Time   /63 10/24/24 0913   Temp 97.6 °F (36.4 °C) 10/24/24 0913   Pulse 82 10/24/24 0913   Resp 16 10/24/24 0913   SpO2 97 % 10/24/24 0913       Patient Location: Endoscopy    Anesthesia Type: MAC    Airway Patency: patent    Postop Pain Control: adequate    Mental Status: preanesthetic baseline    Nausea/Vomiting: none    Cardiopulmonary/Hydration status: stable euvolemic    Complications: no apparent anesthesia related complications    Postop vital signs: stable    Dental Exam: Unchanged from Preop    Patient to be discharged home when criteria met.

## 2025-03-18 ENCOUNTER — OFFICE VISIT (OUTPATIENT)
Dept: FAMILY MEDICINE CLINIC | Facility: CLINIC | Age: 55
End: 2025-03-18
Payer: COMMERCIAL

## 2025-03-18 VITALS
OXYGEN SATURATION: 99 % | HEART RATE: 62 BPM | DIASTOLIC BLOOD PRESSURE: 80 MMHG | HEIGHT: 66 IN | TEMPERATURE: 97 F | BODY MASS INDEX: 24.01 KG/M2 | RESPIRATION RATE: 16 BRPM | SYSTOLIC BLOOD PRESSURE: 120 MMHG | WEIGHT: 149.38 LBS

## 2025-03-18 DIAGNOSIS — R73.01 IFG (IMPAIRED FASTING GLUCOSE): ICD-10-CM

## 2025-03-18 DIAGNOSIS — Z00.00 ENCOUNTER FOR ANNUAL PHYSICAL EXAM: Primary | ICD-10-CM

## 2025-03-18 PROCEDURE — 99396 PREV VISIT EST AGE 40-64: CPT | Performed by: FAMILY MEDICINE

## 2025-03-18 NOTE — PROGRESS NOTES
/80   Pulse 62   Temp 96.9 °F (36.1 °C) (Temporal)   Resp 16   Ht 5' 6\" (1.676 m)   Wt 149 lb 6 oz (67.8 kg)   SpO2 99%   BMI 24.11 kg/m²  Body mass index is 24.11 kg/m².     Chief Complaint   Patient presents with    Physical     Dr. Mooney would like to use a new tool that securely records the visit.  This will help him write his notes, so he can pay closer attention to you and less time on the computer  The following individual(s) verbally consented to be recorded using ambient AI listening technology and understand that they can each withdraw their consent to this listening technology at any point by asking the clinician to turn off or pause the recording:    Patient name: Jan Piper   Additional names:          Test Results     Colonoscopy       Jan Piper is a 54 year old male who presents for a complete physical exam.   HPI:     History of Present Illness  Jan Piper is a 54 year old male who presents for an annual physical exam.    He was last seen in June 2023 for a physical and lab work, which he did not complete at that time. No new symptoms or issues have been reported since his last visit.    He underwent a colonoscopy in October 2024, which revealed a polyp at the appendix, a polyp at the rectosigmoid, and two rectal polyps. Pathology showed no cancer, with findings of tubular adenomas and hyperplastic polyps. He is unsure of the follow-up interval recommended by the performing physician.    He mentions having undergone a heart scan, which showed a calcium score of zero, indicating no calcified plaque in the coronary arteries. His last A1c was 6.4, but he is uncertain of the exact date of this test.    He is due for a pneumonia vaccine, which he has not yet received. He has some concern about potential side effects but is informed that it is not a live vaccine.    He traveled to [place] for three months after his colonoscopy and returned a month ago. His  phone was operational during his trip, suggesting he was reachable despite being abroad.    No chest pain or other acute symptoms.     Wt Readings from Last 4 Encounters:   03/18/25 149 lb 6 oz (67.8 kg)   10/24/24 148 lb (67.1 kg)   08/29/24 148 lb (67.1 kg)   06/05/23 148 lb (67.1 kg)     Body mass index is 24.11 kg/m².   BP Readings from Last 3 Encounters:   03/18/25 120/80   10/24/24 111/79   06/05/23 124/78      Current Outpatient Medications   Medication Sig Dispense Refill    PEG 3350-KCl-Na Bicarb-NaCl (TRILYTE) 420 g Oral Recon Soln Starting at 4:00 pm the night before procedure, drink 8 ounces of the prep every 15-20 minutes until finished 1 each 0      Past Medical History:    Chest pain, unspecified    Impaired fasting glucose    Lumbago    Tietze's disease      Past Surgical History:   Procedure Laterality Date    Colonoscopy N/A 10/24/2024    Procedure: COLONOSCOPY with forcep and cold snare polypectomy;  Surgeon: Davy Boland MD;  Location:  ENDOSCOPY      Family History   Problem Relation Age of Onset    Other (Reflux [Other]) Unknown       Social History:  Social History     Socioeconomic History    Marital status:    Tobacco Use    Smoking status: Never    Smokeless tobacco: Never    Tobacco comments:     Non-smoker   Vaping Use    Vaping status: Never Used   Substance and Sexual Activity    Alcohol use: Yes     Alcohol/week: 2.0 standard drinks of alcohol     Types: 2 Shots of liquor per week     Comment: Occasionally    Drug use: No   Other Topics Concern    Caffeine Concern No    Stress Concern No    Weight Concern No    Special Diet No    Exercise Yes     Comment: 3 miles walking daily    Seat Belt No      Exercise: none.  Diet: doesn't watch     REVIEW OF SYSTEMS:   GENERAL HEALTH: feels well otherwise, denies fever  SKIN: denies any unusual skin lesions or rashes  EYES: no visual complaints or deficits  HEENT: denies nasal congestion, sinus pain or sore throat; hearing loss  negative  RESPIRATORY: denies shortness of breath, wheezing or cough  CARDIOVASCULAR: denies chest pain or BARROSO; no palpitations  GI: denies nausea, vomiting, constipation, diarrhea; no rectal bleeding; no heartburn  MUSCULOSKELETAL: no joint complaints upper or lower extremities  NEURO: no sensory or motor complaint  PSYCHE: no symptoms of depression or anxiety  HEMATOLOGY: denies h/o anemia; denies bruising or excessive bleeding  ENDOCRINE: denies excessive thirst or urination; denies unexpected wt gain or wt loss  ALLERGY/IMM.: denies food or seasonal allergies    EXAM:   /80   Pulse 62   Temp 96.9 °F (36.1 °C) (Temporal)   Resp 16   Ht 5' 6\" (1.676 m)   Wt 149 lb 6 oz (67.8 kg)   SpO2 99%   BMI 24.11 kg/m²  Body mass index is 24.11 kg/m².   GENERAL: well developed, well nourished,in no apparent distress  SKIN: no rashes,no suspicious lesions  HEENT: atraumatic, normocephalic,ears and throat are clear  EYES:PERRLA, EOMI,conjunctiva are clear  NECK: supple,no adenopathy,no bruits  CHEST: no chest tenderness  LUNGS: clear to auscultation  CARDIO: RRR without murmur  GI: good BS's,no masses, HSM or tenderness  : Deferred  RECTAL:Deferred  MUSCULOSKELETAL: back is not tender,FROM of the back  EXTREMITIES: no cyanosis, clubbing or edema  NEURO: Oriented times three,cranial nerves are intact,motor and sensory are grossly intact    ASSESSMENT AND PLAN:   :Jan was seen today for physical and test results.    Diagnoses and all orders for this visit:    Encounter for annual physical exam  Jan Piper is a 54 year old male who presents for a complete physical exam.   Pt's weight is Body mass index is 24.11 kg/m².,   Eat a heart-healthy diet. Include potassium and fiber, and drink plenty of water.   Exercise regularly --- Dietary measures discussed include diet about 1800 calories - Excercise regimen also reviewed as well as long term benefits on overall health.   Recommend at least 30 minutes a  day 3-4 times a week of aerobic activity such as brisk walking, cycling, aerobics, or swimming.  Anerobic activities also encouraged for overall toning and strength/endurance building    The patient indicates understanding of these issues and agrees to the plan.-Fasting labs ordered  Immunizations reviewed  Patient would like to get Prevnar 20 today  Depression screen completed   diet and exercise counseling given     CBC With Differential With Platelet  -     Comp Metabolic Panel (14)  -     Lipid Panel  -     TSH and Free T4  -     PSA - Total [5363][Q]    IFG (impaired fasting glucose)  Impaired fasting glucose  A1c at 6.4 indicates impaired fasting glucose. Repeat blood work needed.  --     Hemoglobin A1C    Other orders  -     Prevnar 20 (PCV20) [87118]        .        No follow-ups on file.        Fitz Mooney MD, 3/18/2025, 3:06 PM      This dictation was performed with a verbal recognition program (DRAGON) and it was checked for errors. It is possible that there are still dictated errors within this office note. If so, please bring any errors to my attention for an addendum. All efforts were made to ensure that this office note is accurate

## 2025-03-18 NOTE — PATIENT INSTRUCTIONS
VISIT SUMMARY:    Jan Piper, a 54-year-old male, came in for his annual physical exam. He has no new symptoms since his last visit in June 2023. He had a colonoscopy in October 2024, which found polyps but no cancer. He also had a heart scan showing no calcified plaque and an A1c test indicating impaired fasting glucose. He is due for a pneumonia vaccine and has agreed to receive it.    YOUR PLAN:    -COLONIC POLYPS: Colonic polyps are growths on the lining of the colon which can sometimes develop into cancer. Your colonoscopy found polyps, including tubular adenomas, which are not cancerous but may require more frequent check-ups. Please contact your gastroenterologist to discuss the follow-up interval for your next colonoscopy.    -IMPAIRED FASTING GLUCOSE: Impaired fasting glucose means your blood sugar levels are higher than normal but not high enough to be classified as diabetes. Your A1c level was 6.4, and we need to repeat blood work to monitor this. We will order tests for complete blood count, chemistry, cholesterol, thyroid, prostate, and A1c.    -GENERAL HEALTH MAINTENANCE: You are due for a pneumonia vaccine, which helps protect against pneumonia. We discussed the potential side effects, and you agreed to receive the Prevnar 20 vaccine today.    INSTRUCTIONS:    Please follow up with your gastroenterologist regarding the surveillance interval for your colonoscopy. Additionally, we will need to repeat your blood work to monitor your glucose levels and other health markers.

## 2025-03-20 LAB
ABSOLUTE BASOPHILS: 47 CELLS/UL (ref 0–200)
ABSOLUTE EOSINOPHILS: 87 CELLS/UL (ref 15–500)
ABSOLUTE LYMPHOCYTES: 2848 CELLS/UL (ref 850–3900)
ABSOLUTE MONOCYTES: 563 CELLS/UL (ref 200–950)
ABSOLUTE NEUTROPHILS: 3156 CELLS/UL (ref 1500–7800)
ALBUMIN/GLOBULIN RATIO: 1.6 (CALC) (ref 1–2.5)
ALBUMIN: 4.4 G/DL (ref 3.6–5.1)
ALKALINE PHOSPHATASE: 74 U/L (ref 35–144)
ALT: 37 U/L (ref 9–46)
AST: 23 U/L (ref 10–35)
BASOPHILS: 0.7 %
BILIRUBIN, TOTAL: 0.9 MG/DL (ref 0.2–1.2)
BUN: 9 MG/DL (ref 7–25)
CALCIUM: 9.7 MG/DL (ref 8.6–10.3)
CARBON DIOXIDE: 25 MMOL/L (ref 20–32)
CHLORIDE: 106 MMOL/L (ref 98–110)
CHOL/HDLC RATIO: 4.2 (CALC)
CHOLESTEROL, TOTAL: 166 MG/DL
CREATININE: 1.06 MG/DL (ref 0.7–1.3)
EGFR: 83 ML/MIN/1.73M2
EOSINOPHILS: 1.3 %
GLOBULIN: 2.8 G/DL (CALC) (ref 1.9–3.7)
GLUCOSE: 122 MG/DL (ref 65–99)
HDL CHOLESTEROL: 40 MG/DL
HEMATOCRIT: 44.1 % (ref 38.5–50)
HEMOGLOBIN A1C: 7.1 % OF TOTAL HGB
HEMOGLOBIN: 14.1 G/DL (ref 13.2–17.1)
LDL-CHOLESTEROL: 99 MG/DL (CALC)
LYMPHOCYTES: 42.5 %
MCH: 26.2 PG (ref 27–33)
MCHC: 32 G/DL (ref 32–36)
MCV: 82 FL (ref 80–100)
MONOCYTES: 8.4 %
MPV: 10.5 FL (ref 7.5–12.5)
NEUTROPHILS: 47.1 %
NON-HDL CHOLESTEROL: 126 MG/DL (CALC)
PLATELET COUNT: 225 THOUSAND/UL (ref 140–400)
POTASSIUM: 4.5 MMOL/L (ref 3.5–5.3)
PROTEIN, TOTAL: 7.2 G/DL (ref 6.1–8.1)
PSA, TOTAL: 0.91 NG/ML
RDW: 13.3 % (ref 11–15)
RED BLOOD CELL COUNT: 5.38 MILLION/UL (ref 4.2–5.8)
SODIUM: 139 MMOL/L (ref 135–146)
T4, FREE: 1.3 NG/DL (ref 0.8–1.8)
TRIGLYCERIDES: 172 MG/DL
TSH: 4.98 MIU/L (ref 0.4–4.5)
WHITE BLOOD CELL COUNT: 6.7 THOUSAND/UL (ref 3.8–10.8)

## 2025-03-21 NOTE — PROGRESS NOTES
Elevated sugar at 122 and abnormal A1c at 7.1, that has crossed the threshold of prediabetes  Please follow-up, to start medication for diabetes  Your blood work also reveals abnormal TSH that needs to be repeated

## 2025-03-24 ENCOUNTER — OFFICE VISIT (OUTPATIENT)
Dept: FAMILY MEDICINE CLINIC | Facility: CLINIC | Age: 55
End: 2025-03-24
Payer: COMMERCIAL

## 2025-03-24 VITALS
SYSTOLIC BLOOD PRESSURE: 120 MMHG | HEART RATE: 69 BPM | BODY MASS INDEX: 23.84 KG/M2 | RESPIRATION RATE: 16 BRPM | WEIGHT: 148.38 LBS | DIASTOLIC BLOOD PRESSURE: 80 MMHG | TEMPERATURE: 97 F | OXYGEN SATURATION: 98 % | HEIGHT: 66 IN

## 2025-03-24 DIAGNOSIS — R79.89 ABNORMAL TSH: ICD-10-CM

## 2025-03-24 DIAGNOSIS — E11.9 TYPE 2 DIABETES MELLITUS WITHOUT COMPLICATION, WITHOUT LONG-TERM CURRENT USE OF INSULIN (HCC): Primary | ICD-10-CM

## 2025-03-24 PROCEDURE — 99214 OFFICE O/P EST MOD 30 MIN: CPT | Performed by: FAMILY MEDICINE

## 2025-03-24 RX ORDER — ROSUVASTATIN CALCIUM 10 MG/1
10 TABLET, COATED ORAL NIGHTLY
Qty: 90 TABLET | Refills: 3 | Status: SHIPPED | OUTPATIENT
Start: 2025-03-24

## 2025-03-24 RX ORDER — METFORMIN HYDROCHLORIDE 500 MG/1
500 TABLET, EXTENDED RELEASE ORAL 2 TIMES DAILY WITH MEALS
Qty: 180 TABLET | Refills: 2 | Status: SHIPPED | OUTPATIENT
Start: 2025-03-24

## 2025-03-24 RX ORDER — LISINOPRIL 5 MG/1
5 TABLET ORAL DAILY
Qty: 90 TABLET | Refills: 1 | Status: SHIPPED | OUTPATIENT
Start: 2025-03-24

## 2025-03-24 NOTE — PROGRESS NOTES
Subjective:   Jan Piper is a 54 year old male who presents for Lab Results (Dr. Mooney would like to use a new tool that securely records the visit.  This will help him write his notes, so he can pay closer attention to you and less time on the computer/The following individual(s) verbally consented to be recorded using ambient AI listening technology and understand that they can each withdraw their consent to this listening technology at any point by asking the clinician to turn off or pause the recording://Patient name: Jan Piper /Additional names:  /  )       History/Other:   History of Present Illness  Jan Piper is a 54 year old male with diabetes who presents for management of high blood sugar levels.    He has been experiencing elevated blood sugar levels, which he attributes to increased stress. Despite being more health-conscious, he feels that his current environment contributes to these elevated levels. He describes his daily routine as a continuous cycle of stress impacting his health.    No chest pain, shortness of breath, or palpitations.   Chief Complaint Reviewed and Verified  Nursing Notes Reviewed and   Verified  Tobacco Reviewed  Allergies Reviewed  Medications Reviewed    Problem List Reviewed  Medical History Reviewed  Surgical History   Reviewed  Family History Reviewed       Tobacco:  He has never smoked tobacco.    Current Outpatient Medications   Medication Sig Dispense Refill    PEG 3350-KCl-Na Bicarb-NaCl (TRILYTE) 420 g Oral Recon Soln Starting at 4:00 pm the night before procedure, drink 8 ounces of the prep every 15-20 minutes until finished 1 each 0       PHQ-2 SCORE: 0  , done 3/18/2025          Review of Systems:  Review of Systems:   Constitutional: No fevers, chills, fatigue or night sweats.  ENT: No mouth pain, neck pain, running nose, headaches or swollen glands.  Skin: No rashes, pruritus or skin changes,  Respiratory: Denies cough,  wheezing or shortness of breath.  CV: Denies chest pain, palpitations, orthopnea, PND or dizziness.  Musculoskeletal: No joint pain, stiffness or swelling.  GI: No nausea, vomiting or diarrhea. No blood in stools.  Neurologic: No seizures, tremors, weakness or numbness      Objective:   /80   Pulse 69   Temp 96.8 °F (36 °C) (Temporal)   Resp 16   Ht 5' 6\" (1.676 m)   Wt 148 lb 6 oz (67.3 kg)   SpO2 98%   BMI 23.95 kg/m²  Estimated body mass index is 23.95 kg/m² as calculated from the following:    Height as of this encounter: 5' 6\" (1.676 m).    Weight as of this encounter: 148 lb 6 oz (67.3 kg).  Results  LABS  TSH: abnormal   Hba1c 7.1  Physical Exam        Assessment & Plan:   There are no diagnoses linked to this encounter.  Assessment & Plan  Type 2 Diabetes Mellitus  New diagnosis with elevated blood glucose. Discussed complications: retinopathy, cardiovascular events, amputations, nephropathy. Emphasized glycemic control. Explained metformin's role. Discussed continuous glucose monitor and insurance issues.  - Prescribe metformin 500 mg twice daily.  - Refer to ophthalmology for diabetic retinopathy screening.  - Provide glucometer for home blood glucose monitoring.  - Discuss potential use of continuous glucose monitor, noting insurance coverage issues.  Statin therapy recommended to reduce cardiovascular risk associated with diabetes.  - Prescribe rosuvastatin once daily.    Chronic Kidney Disease Risk  Prophylactic ACE inhibitor for nephroprotection in diabetes context.  - Prescribe lisinopril once daily.    Thyroid Function Abnormality  Mildly elevated TSH. Monitor thyroid function over three months.  - Repeat TSH test in three months.    General Health Maintenance  Emphasized lifestyle modifications, including dietary changes, for diabetes management.  - Advise dietary modifications to support diabetes management.    Follow-up  Plan to reassess treatment regimen effectiveness and monitor for  adverse effects.  - Schedule follow-up in three months to review blood tests and assess treatment efficacy.        No follow-ups on file.        Fitz Mooney MD, 3/24/2025, 4:47 PM

## 2025-06-21 LAB
AMB EXT BILIRUBIN URINE: NEGATIVE
AMB EXT BLOOD URINE: NEGATIVE
AMB EXT BUN: 8 MG/DL (ref ?–8)
AMB EXT CHOL/HDL RATIO: 4.6
AMB EXT CHOLESTEROL, TOTAL: 151 MG/DL
AMB EXT GLUCOSE URINE: NEGATIVE
AMB EXT GLUCOSE: 93 MG/DL (ref 70–100)
AMB EXT HDL CHOLESTEROL: 33 MG/DL
AMB EXT HGBA1C: 6.2 %
AMB EXT KETONES URINE: NEGATIVE
AMB EXT LDL CHOLESTEROL, DIRECT: 98 MG/DL
AMB EXT LEUKOCYTE ESTERASE URINE: POSITIVE
AMB EXT MCV: 79.6 (ref ?–83)
AMB EXT NITRITE URINE: NEGATIVE
AMB EXT NON HDL CHOL: 118 MG/DL
AMB EXT PH URINE: 6
AMB EXT PLATELETS: 205
AMB EXT PROTEIN URINE: NEGATIVE
AMB EXT TRIGLYCERIDES: 103 MG/DL
AMB EXT TSH: 4.51 MIU/ML
AMB EXT URINE SPECIFIC GRAVITY: 1.01
AMB EXT UROBILINOGEN URINE: NORMAL
AMB EXT VITAMIN D, 25-HYDROXY: 6.1 (ref 30–?)
AMB EXT VLDL: 21 MG/DL
AMB EXT WBC: 6.7 X10(3)UL

## 2025-07-15 ENCOUNTER — APPOINTMENT (OUTPATIENT)
Dept: GENERAL RADIOLOGY | Age: 55
End: 2025-07-15
Attending: NURSE PRACTITIONER
Payer: COMMERCIAL

## 2025-07-15 ENCOUNTER — HOSPITAL ENCOUNTER (OUTPATIENT)
Age: 55
Discharge: HOME | End: 2025-07-15
Payer: COMMERCIAL

## 2025-07-15 VITALS
OXYGEN SATURATION: 95 % | SYSTOLIC BLOOD PRESSURE: 128 MMHG | DIASTOLIC BLOOD PRESSURE: 86 MMHG | RESPIRATION RATE: 16 BRPM | TEMPERATURE: 99 F | HEART RATE: 83 BPM

## 2025-07-15 DIAGNOSIS — S92.355A CLOSED NONDISPLACED FRACTURE OF FIFTH METATARSAL BONE OF LEFT FOOT, INITIAL ENCOUNTER: ICD-10-CM

## 2025-07-15 DIAGNOSIS — S99.912A INJURY OF LEFT ANKLE, INITIAL ENCOUNTER: Primary | ICD-10-CM

## 2025-07-15 PROCEDURE — 99203 OFFICE O/P NEW LOW 30 MIN: CPT | Performed by: NURSE PRACTITIONER

## 2025-07-15 PROCEDURE — 29515 APPLICATION SHORT LEG SPLINT: CPT | Performed by: NURSE PRACTITIONER

## 2025-07-15 PROCEDURE — 73610 X-RAY EXAM OF ANKLE: CPT | Performed by: NURSE PRACTITIONER

## 2025-07-15 PROCEDURE — 73630 X-RAY EXAM OF FOOT: CPT | Performed by: NURSE PRACTITIONER

## 2025-07-15 PROCEDURE — E0114 CRUTCH UNDERARM PAIR NO WOOD: HCPCS | Performed by: NURSE PRACTITIONER

## 2025-07-15 NOTE — ED PROVIDER NOTES
Patient Seen in: [facility]       The following individual(s) verbally consented to be recorded using ambient AI listening technology and understand that they can each withdraw their consent to this listening technology at any point by asking the clinician to turn off or pause the recording:    Patient name: Jan Piper        History  Chief Complaint   Patient presents with    Fall     Stated Complaint: Fall    Subjective:   The history is provided by the patient.        Jan Piper is a 54 year old male who presents with left ankle pain after rolling his ankle.    He experienced left ankle pain after rolling his ankle while descending a deck yesterday.  States he missed a step and rolled his left ankle.  No fall to the ground.  The pain is primarily located in the left ankle and foot. The pain does not consistently hurt when walking, but at times it is quite painful. He took pain medication last night to manage the discomfort.    No pain in the knee or other injuries such as to the wrist or head. The pain is more pronounced in the foot than the ankle.    He denies any allergies to medications. He currently takes metformin but does not take lisinopril or rolastatin, despite having been prescribed these medications for blood pressure and cholesterol management.    He does not smoke or use alcohol.        Objective:     Past Medical History:    Chest pain, unspecified    Impaired fasting glucose    Lumbago    Tietze's disease              Past Surgical History:   Procedure Laterality Date    Colonoscopy N/A 10/24/2024    Procedure: COLONOSCOPY with forcep and cold snare polypectomy;  Surgeon: Davy Boland MD;  Location:  ENDOSCOPY                Social History     Socioeconomic History    Marital status:    Tobacco Use    Smoking status: Never    Smokeless tobacco: Never    Tobacco comments:     Non-smoker   Vaping Use    Vaping status: Never Used   Substance  and Sexual Activity    Alcohol use: Yes     Alcohol/week: 2.0 standard drinks of alcohol     Types: 2 Shots of liquor per week     Comment: Occasionally    Drug use: No   Other Topics Concern    Caffeine Concern No    Stress Concern No    Weight Concern No    Special Diet No    Exercise Yes     Comment: 3 miles walking daily    Seat Belt No     Social Drivers of Health     Food Insecurity: No Food Insecurity (3/18/2025)    NCSS - Food Insecurity     Worried About Running Out of Food in the Last Year: No     Ran Out of Food in the Last Year: No   Transportation Needs: No Transportation Needs (3/18/2025)    NCSS - Transportation     Lack of Transportation: No   Housing Stability: Not At Risk (3/18/2025)    NCSS - Housing/Utilities     Has Housing: Yes     Worried About Losing Housing: No     Unable to Get Utilities: No              Review of Systems    Positive for stated complaint: Fall  Other systems are as noted in HPI.  Constitutional and vital signs reviewed.      All other systems reviewed and negative except as noted above.      Physical Exam    ED Triage Vitals [07/15/25 1418]   /86   Pulse 83   Resp 16   Temp 98.7 °F (37.1 °C)   Temp src Oral   SpO2 95 %   O2 Device None (Room air)       Current Vitals:   Vital Signs  BP: 128/86  Pulse: 83  Resp: 16  Temp: 98.7 °F (37.1 °C)  Temp src: Oral    Oxygen Therapy  SpO2: 95 %  O2 Device: None (Room air)            Physical Exam  Vitals and nursing note reviewed.   Constitutional:       General: He is not in acute distress.     Appearance: Normal appearance. He is not ill-appearing or toxic-appearing.   HENT:      Head: Normocephalic and atraumatic.      Nose: Nose normal.      Mouth/Throat:      Mouth: Mucous membranes are moist.      Pharynx: Oropharynx is clear.   Eyes:      Pupils: Pupils are equal, round, and reactive to light.   Cardiovascular:      Rate and Rhythm: Normal rate and regular rhythm.      Pulses: Normal pulses.   Pulmonary:      Effort:  Pulmonary effort is normal. No respiratory distress.      Breath sounds: Normal breath sounds.      Comments: Lungs clear.  No adventitious lung sounds.  No distress.  No hypoxia.  Pulse ox 95% ra. Which is normal    Abdominal:      General: Abdomen is flat.      Palpations: Abdomen is soft.   Musculoskeletal:         General: No signs of injury. Normal range of motion.      Cervical back: Normal range of motion and neck supple.      Left ankle: No swelling or deformity. Tenderness present over the lateral malleolus. Normal pulse.      Left Achilles Tendon: Normal. No defects.      Left foot: Swelling and bony tenderness (proximal 5th metatarsal) present. No deformity.   Skin:     General: Skin is warm and dry.      Capillary Refill: Capillary refill takes less than 2 seconds.   Neurological:      General: No focal deficit present.      Mental Status: He is alert and oriented to person, place, and time.      GCS: GCS eye subscore is 4. GCS verbal subscore is 5. GCS motor subscore is 6.   Psychiatric:         Mood and Affect: Mood normal.         Behavior: Behavior normal.         Thought Content: Thought content normal.         Judgment: Judgment normal.         ED Course  Labs Reviewed - No data to display   XR FOOT, COMPLETE (MIN 3 VIEWS), LEFT (CPT=73630)  Result Date: 7/15/2025  CONCLUSION: Nondisplaced fifth metatarsal fracture proximally. Electronically Verified and Signed by Attending Radiologist: Godfrey Hernandez MD 7/15/2025 2:34 PM Workstation: [id number]    XR ANKLE (MIN 3 VIEWS), LEFT (CPT=73610)  Result Date: 7/15/2025  CONCLUSION: No acute osseous findings within the ankle. There is a nondisplaced fifth metatarsal fracture proximally, dictated separately. Electronically Verified and Signed by Attending Radiologist: Godfrey Hernandez MD 7/15/2025 2:33 PM Workstation: [id number]    A short leg splint was applied. After application of the splint I returned and re-examined the patient. The splint was adequately  immobilizing the joint/injury.  Distal circulation and sensation was intact.       MDM         Medical Decision Making  Differential diagnoses reflecting the complexity of care include: Rolled left ankle, left ankle sprain versus fracture, left foot sprain versus fracture  Rolled left ankle, complaining of lateral ankle and foot pain  X-ray of the left foot is showing a proximal fifth metatarsal fracture.  No ankle fracture.  Patient declined pain medication  Short leg splint applied, CMS intact.  Crutches and training provided.    Plan of Care: Ibuprofen as needed for pain.  Ice.  Elevate.  Rest.  Use the crutches.  Keep the splint clean and dry.  Follow-up with podiatry, referral provided    Results and plan of care discussed with the patient/family. They are in agreement with discharge. They understand to follow up with their primary doctor or the referral physician for further evaluation, especially if no improvement.  Also discussed the limitations of immediate care, patient is aware that if symptoms are worse they should go to the emergency room. Verbal and written discharge instructions were given.     My independent interpretation of studies of: Proximal left fifth metatarsal fracture noted on x-ray  Diagnostic tests and medications considered but not ordered were: Patient declined pain medication  Shared decision making was done by: Patient declined pain medication  Comorbidities that add complexity to management include: Type 2 diabetes  External chart review was done and was noted: A1c 7.1 in March  History obtained by an independent source was from: Here with family  Discussions and management was done with: N/A  Social determinants of health that affect care: N/A              Problems Addressed:  Closed nondisplaced fracture of fifth metatarsal bone of left foot, initial encounter: acute illness or injury  Injury of left ankle, initial encounter: acute illness or injury    Amount and/or Complexity of  Data Reviewed  Independent Historian: spouse  External Data Reviewed: labs.  Radiology: ordered and independent interpretation performed. Decision-making details documented in ED Course.    Risk  OTC drugs.        Disposition and Plan     Clinical Impression:  1. Injury of left ankle, initial encounter    2. Closed nondisplaced fracture of fifth metatarsal bone of left foot, initial encounter         Disposition:  Discharge  7/15/2025  2:46 pm    Follow-up:  Jayme Wills DPM  [address] [address]  [phone]                Medications Prescribed:  Current Discharge Medication List                Supplementary Documentation:

## 2025-07-15 NOTE — ED INITIAL ASSESSMENT (HPI)
Patient states one day ago- missed 4 steps off his deck- fell onto the grass.  Injured left ankle

## 2025-07-15 NOTE — DISCHARGE INSTRUCTIONS
Take 2 to 3 tablets of ibuprofen every 6 hours as needed for pain.  Ice.  Elevate.  Rest.  Keep the splint clean and dry.  Use the crutches.  Schedule follow-up with podiatry

## 2025-07-16 ENCOUNTER — TELEPHONE (OUTPATIENT)
Dept: ORTHOPEDICS CLINIC | Facility: CLINIC | Age: 55
End: 2025-07-16

## 2025-07-16 NOTE — TELEPHONE ENCOUNTER
Patient called to check on the status of his request for an appointment.   Offered Rima Graff per message below.     Future Appointments   Date Time Provider Department Center   7/17/2025  3:00 PM Bud Graff APRN [id number]

## 2025-07-16 NOTE — TELEPHONE ENCOUNTER
Patient called to schedule for left foot fx. Please advise when patient can be seen   Nondisplaced fifth metatarsal fracture proximally.

## 2025-07-17 ENCOUNTER — OFFICE VISIT (OUTPATIENT)
Facility: LOCATION | Age: 55
End: 2025-07-17
Payer: COMMERCIAL

## 2025-07-17 DIAGNOSIS — M79.672 LEFT FOOT PAIN: ICD-10-CM

## 2025-07-17 DIAGNOSIS — S92.355A NONDISPLACED FRACTURE OF FIFTH METATARSAL BONE, LEFT FOOT, INITIAL ENCOUNTER FOR CLOSED FRACTURE: Primary | ICD-10-CM

## 2025-07-17 PROCEDURE — 99203 OFFICE O/P NEW LOW 30 MIN: CPT

## 2025-07-17 RX ORDER — HYDROCHLOROTHIAZIDE 12.5 MG/1
CAPSULE ORAL
COMMUNITY
Start: 2025-05-09

## 2025-07-17 NOTE — PROGRESS NOTES
[facility]  Progress Note      Jan Piper is a 54 year old male.   Chief Complaint   Patient presents with    Foot Pain     Left foot fracture  Diabetic patient:  A1c on 3/19/25: 7.1  LOV with PCP: 3/24/2025 with Dr.Kaleem Mooney         HPI:     The following individual(s) verbally consented to be recorded using ambient AI listening technology and understand that they can each withdraw their consent to this listening technology at any point by asking the clinician to turn off or pause the recording:      History of Present Illness  Jna Piper is a 54 year old male who presents with left foot pain and swelling following a fall.    He injured his left foot after falling down three to four steps approximately two to three days ago. Pain began about thirty minutes post-fall and has persisted since then. Swelling started the evening of the fall, initially localized to the side of the foot, and has since spread.    He has been taking Motrin for pain management, which provides some relief. An x-ray at an urgent care revealed a fracture in the metatarsal bone of his left foot. He was placed in a post-mold splint, which he finds difficult to tolerate but manageable.    He is currently taking Motrin for pain relief. 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      Allergies: Patient has no known allergies.   Current Medications[1]   Past Medical History[2]   Past Surgical History[3]   Family History[4]   Social Hx on file[5]        REVIEW OF SYSTEMS:     10 point ROS completed and was negative unless stated in HPI.      EXAM:   GENERAL: well developed, well nourished, in no apparent distress  EXTREMITIES:  1. Integument: Skin appears moist, warm, and supple. There are no color changes. No open lesions. No macerations. No Hyperkeratotic lesions.   2. Vascular: Dorsalis pedis 2/4 bilateral and posterior tibial pulses 2/4 bilateral, capillary refill normal. There is moderate  swelling to the left foot.  3. Neurological: Gross sensation intact via light touch bilaterally.  Normal sharp/dull sensation  4. Musculoskeletal: All muscle groups are graded 5/5 in the foot and ankle. Patient does have pain with palpation of the base of the left fifth met area.       ASSESSMENT AND PLAN:   Diagnoses and all orders for this visit:    Nondisplaced fracture of fifth metatarsal bone, left foot, initial encounter for closed fracture  -     DME - EXTERNAL     Left foot pain  -     DME - EXTERNAL         Plan:   -Patient was seen and evaluated today in clinic.  Chart history reviewed.    Assessment & Plan  Fracture of left metatarsal bone  X-ray confirmed non-displaced fracture with perfect alignment, favorable prognosis for healing in six to eight weeks.  - Advise non-weight bearing on left foot for four weeks.  - Recommend knee scooter for mobility.  - Provide short CAM boot for immobilization, removable for showering without weight bearing. CAM boot is dispensed at today's visit.  - Instruct on seated ankle range of motion exercises.  - Advise elevation and icing to manage swelling.  - Schedule follow-up in four weeks for healing assessment and x-ray.  - Advise Tylenol or Motrin for pain as needed.        -All of the patient's questions and concerns were addressed.  They indicated their understanding of these issues and agrees to the plan.    Time spent reviewing pertinent information from patient's chart, reviewing any pertinent imaging, obtaining history and physical exam, discussing and mutually agreeing on a treatment plan, and documenting encounter: 20 minutes    RTC 4 weeks for repeat xray's    VERONICA Suarez        [facility]            Dragon speech recognition software was used to prepare this note.  Errors in word recognition may occur.  Please contact me with any questions/concerns with this note.        [1]   Current Outpatient Medications   Medication Sig  Dispense Refill    Continuous Glucose Sensor (FREESTYLE LAKISHA 3 PLUS SENSOR) Does not apply Misc USE 1 SENSOR AS DIRECTED AND CHANGE SENSOR EVERY 15 DAYS      metFORMIN  MG Oral Tablet 24 Hr Take 1 tablet (500 mg total) by mouth 2 (two) times daily with meals. 180 tablet 2    rosuvastatin 10 MG Oral Tab Take 1 tablet (10 mg total) by mouth nightly. (Patient not taking: Reported on 7/15/2025) 90 tablet 3    lisinopril 5 MG Oral Tab Take 1 tablet (5 mg total) by mouth daily. (Patient not taking: Reported on 7/15/2025) 90 tablet 1    PEG 3350-KCl-Na Bicarb-NaCl (TRILYTE) 420 g Oral Recon Soln Starting at 4:00 pm the night before procedure, drink 8 ounces of the prep every 15-20 minutes until finished 1 each 0   [2]   Past Medical History:   Chest pain, unspecified    Impaired fasting glucose    Lumbago    Tietze's disease   [3]   Past Surgical History:  Procedure Laterality Date    Colonoscopy N/A 10/24/2024    Procedure: COLONOSCOPY with forcep and cold snare polypectomy;  Surgeon: Davy Boland MD;  Location:  ENDOSCOPY   [4]   Family History  Problem Relation Age of Onset    Other (Reflux [Other]) Unknown    [5]   Social History  Socioeconomic History    Marital status:    Tobacco Use    Smoking status: Never    Smokeless tobacco: Never    Tobacco comments:     Non-smoker   Vaping Use    Vaping status: Never Used   Substance and Sexual Activity    Alcohol use: Yes     Alcohol/week: 2.0 standard drinks of alcohol     Types: 2 Shots of liquor per week     Comment: Occasionally    Drug use: No   Other Topics Concern    Caffeine Concern No    Stress Concern No    Weight Concern No    Special Diet No    Exercise Yes     Comment: 3 miles walking daily    Seat Belt No

## 2025-08-05 ENCOUNTER — OFFICE VISIT (OUTPATIENT)
Dept: FAMILY MEDICINE CLINIC | Facility: CLINIC | Age: 55
End: 2025-08-05

## 2025-08-05 VITALS
HEIGHT: 66 IN | WEIGHT: 145.13 LBS | RESPIRATION RATE: 16 BRPM | TEMPERATURE: 97 F | HEART RATE: 69 BPM | DIASTOLIC BLOOD PRESSURE: 82 MMHG | BODY MASS INDEX: 23.32 KG/M2 | SYSTOLIC BLOOD PRESSURE: 138 MMHG | OXYGEN SATURATION: 98 %

## 2025-08-05 DIAGNOSIS — E53.8 B12 DEFICIENCY: ICD-10-CM

## 2025-08-05 DIAGNOSIS — E55.9 VITAMIN D DEFICIENCY: ICD-10-CM

## 2025-08-05 DIAGNOSIS — D50.9 IRON DEFICIENCY ANEMIA, UNSPECIFIED IRON DEFICIENCY ANEMIA TYPE: ICD-10-CM

## 2025-08-05 DIAGNOSIS — S82.891A CLOSED FRACTURE OF RIGHT ANKLE, INITIAL ENCOUNTER: ICD-10-CM

## 2025-08-05 DIAGNOSIS — E11.9 TYPE 2 DIABETES MELLITUS WITHOUT COMPLICATION, WITHOUT LONG-TERM CURRENT USE OF INSULIN (HCC): Primary | ICD-10-CM

## 2025-08-05 LAB
CRP HIGH SENSITIVITY: 3.6 MG/L (ref 0–7.5)
RHEUMATOID FACTOR: 3.9 (ref ?–14)
SED RATE: 16 MM/HR (ref ?–12)
VITAMIN B12: 202 PG/ML

## 2025-08-05 PROCEDURE — 99214 OFFICE O/P EST MOD 30 MIN: CPT | Performed by: FAMILY MEDICINE

## 2025-08-05 RX ORDER — ROSUVASTATIN CALCIUM 10 MG/1
10 TABLET, COATED ORAL NIGHTLY
COMMUNITY
Start: 2025-08-05

## 2025-08-05 RX ORDER — LISINOPRIL 5 MG/1
5 TABLET ORAL DAILY
COMMUNITY
Start: 2025-08-05

## 2025-08-14 ENCOUNTER — HOSPITAL ENCOUNTER (OUTPATIENT)
Dept: GENERAL RADIOLOGY | Age: 55
Discharge: HOME | End: 2025-08-14

## 2025-08-14 ENCOUNTER — OFFICE VISIT (OUTPATIENT)
Facility: LOCATION | Age: 55
End: 2025-08-14

## 2025-08-14 DIAGNOSIS — S92.355D NONDISPLACED FRACTURE OF FIFTH METATARSAL BONE, LEFT FOOT, SUBSEQUENT ENCOUNTER FOR FRACTURE WITH ROUTINE HEALING: ICD-10-CM

## 2025-08-14 DIAGNOSIS — M79.672 LEFT FOOT PAIN: Primary | ICD-10-CM

## 2025-08-14 DIAGNOSIS — M79.672 LEFT FOOT PAIN: ICD-10-CM

## 2025-08-14 PROCEDURE — 99213 OFFICE O/P EST LOW 20 MIN: CPT

## 2025-08-14 PROCEDURE — 73630 X-RAY EXAM OF FOOT: CPT

## (undated) DEVICE — V2 SPECIMEN COLLECTION MANIFOLD KIT: Brand: NEPTUNE

## (undated) DEVICE — 3M™ RED DOT™ MONITORING ELECTRODE WITH FOAM TAPE AND STICKY GEL, 50/BAG, 20/CASE, 72/PLT 2570: Brand: RED DOT™

## (undated) DEVICE — 10FT COMBINED O2 DELIVERY/CO2 MONITORING. FILTER WITH MICROSTREAM TYPE LUER: Brand: DUAL ADULT NASAL CANNULA

## (undated) DEVICE — 1200CC GUARDIAN II: Brand: GUARDIAN

## (undated) DEVICE — LASSO POLYPECTOMY SNARE: Brand: LASSO

## (undated) DEVICE — KIT VLV 5 PC AIR H2O SUCT BX ENDOGATOR CONN

## (undated) DEVICE — TRAP POLYP W/ 2 SPEC TY CLR MAGNIFYING WIND

## (undated) DEVICE — GIJAW SINGLE-USE BIOPSY FORCEPS WITH NEEDLE: Brand: GIJAW

## (undated) DEVICE — KIT CUSTOM ENDOPROCEDURE STERIS

## (undated) NOTE — LETTER
10/28/2021       Jan [place] [id number]      Dear Bia Herring,    IF YOU ARE 48YEARS OF AGE OR OLDER, COLORECTAL CANCER SCREENING CAN SAVE YOUR LIFE.     As your primary care doctor, I want to do everything I can to protect your h

## (undated) NOTE — LETTER
10/30/20        Lanette Zimmerman [place] [id number]      Dear Jimmy Pham,    [facility] records indicate that you have outstanding lab work and or testing that was ordered for you and has not yet been completed:  Orders Placed This Encounter      C